# Patient Record
Sex: MALE | NOT HISPANIC OR LATINO | URBAN - METROPOLITAN AREA
[De-identification: names, ages, dates, MRNs, and addresses within clinical notes are randomized per-mention and may not be internally consistent; named-entity substitution may affect disease eponyms.]

---

## 2023-02-15 ENCOUNTER — OFFICE VISIT (OUTPATIENT)
Dept: FAMILY MEDICINE | Facility: CLINIC | Age: 28
End: 2023-02-15
Payer: COMMERCIAL

## 2023-02-15 VITALS
SYSTOLIC BLOOD PRESSURE: 116 MMHG | WEIGHT: 295 LBS | TEMPERATURE: 97.8 F | HEART RATE: 84 BPM | RESPIRATION RATE: 20 BRPM | OXYGEN SATURATION: 96 % | DIASTOLIC BLOOD PRESSURE: 81 MMHG

## 2023-02-15 DIAGNOSIS — R05.1 ACUTE COUGH: Primary | ICD-10-CM

## 2023-02-15 LAB
DEPRECATED S PYO AG THROAT QL EIA: NEGATIVE
FLUAV AG SPEC QL IA: NEGATIVE
FLUBV AG SPEC QL IA: NEGATIVE
GROUP A STREP BY PCR: NOT DETECTED

## 2023-02-15 PROCEDURE — 87804 INFLUENZA ASSAY W/OPTIC: CPT

## 2023-02-15 PROCEDURE — 87651 STREP A DNA AMP PROBE: CPT

## 2023-02-15 PROCEDURE — U0003 INFECTIOUS AGENT DETECTION BY NUCLEIC ACID (DNA OR RNA); SEVERE ACUTE RESPIRATORY SYNDROME CORONAVIRUS 2 (SARS-COV-2) (CORONAVIRUS DISEASE [COVID-19]), AMPLIFIED PROBE TECHNIQUE, MAKING USE OF HIGH THROUGHPUT TECHNOLOGIES AS DESCRIBED BY CMS-2020-01-R: HCPCS

## 2023-02-15 PROCEDURE — U0005 INFEC AGEN DETEC AMPLI PROBE: HCPCS

## 2023-02-15 PROCEDURE — 99203 OFFICE O/P NEW LOW 30 MIN: CPT | Mod: CS

## 2023-02-15 RX ORDER — ATOMOXETINE 60 MG/1
60 CAPSULE ORAL DAILY
COMMUNITY

## 2023-02-15 RX ORDER — CETIRIZINE HYDROCHLORIDE 10 MG/1
10 TABLET ORAL DAILY
COMMUNITY

## 2023-02-15 RX ORDER — GABAPENTIN 800 MG/1
800 TABLET ORAL 3 TIMES DAILY
COMMUNITY

## 2023-02-15 RX ORDER — DULOXETIN HYDROCHLORIDE 60 MG/1
60 CAPSULE, DELAYED RELEASE ORAL DAILY
COMMUNITY

## 2023-02-15 NOTE — PROGRESS NOTES
Assessment & Plan     Acute cough    - Symptomatic COVID-19 Virus (Coronavirus) by PCR Nose  - Streptococcus A Rapid Scr w Reflx to PCR  - Influenza A/B antigen  - Group A Streptococcus PCR Throat Swab      15 minutes spent on the date of the encounter doing review of test results, patient visit and documentation        See Patient Instructions    Return in about 1 week (around 2/22/2023), or if symptoms worsen or fail to improve.    [facility]  [facility]    Cande Pang is a 27 year old presenting for the following health issues:  Cough, Pharyngitis, and URI      HPI     Acute Illness  Acute illness concerns: cough, st, body aches  Onset/Duration: 2 days  Symptoms:  Fever: No  Chills/Sweats: YES  Headache (location?): No  Sinus Pressure: No  Conjunctivitis:  No  Ear Pain: no  Rhinorrhea: YES  Congestion: YES  Sore Throat: YES  Cough: YES-productive of clear sputum  Wheeze: No  Decreased Appetite: YES  Nausea: YES  Vomiting: No  Diarrhea: No  Fatigue/Achiness: YES  Sick/Strep Exposure: YES- co workers  Therapies tried and outcome: None      Review of Systems   Constitutional, HEENT, cardiovascular, pulmonary, gi and gu systems are negative, except as otherwise noted.      Objective    /81   Pulse 84   Temp 97.8  F (36.6  C) (Oral)   Resp 20   Wt 133.8 kg (295 lb)   SpO2 96%   There is no height or weight on file to calculate BMI.  Physical Exam   GENERAL: healthy, alert and no distress  EYES: Eyes grossly normal to inspection, PERRL and conjunctivae and sclerae normal  HENT: normal cephalic/atraumatic, ear canals and TM's normal, nose and mouth without ulcers or lesions, oropharynx clear, oral mucous membranes moist, tonsillar hypertrophy and tonsillar erythema  NECK: no adenopathy  RESP: lungs clear to auscultation - no rales, rhonchi or wheezes          Results for orders placed or performed in visit on 02/15/23    Streptococcus A Rapid Scr w Reflx to PCR     Status: Normal    Specimen: Throat; Swab   Result Value Ref Range    Group A Strep antigen Negative Negative   Influenza A/B antigen     Status: Normal    Specimen: Nose; Swab   Result Value Ref Range    Influenza A antigen Negative Negative    Influenza B antigen Negative Negative    Narrative    Test results must be correlated with clinical data. If necessary, results should be confirmed by a molecular assay or viral culture.

## 2023-02-15 NOTE — LETTER
February 15, 2023      Bird Man  [address] [id number]        To Whom It May Concern:    Bird Man  was seen on 2/15/23.  Please excuse him from work missed 2/15/23 and 2/16/23. He can return to work 2/17/23 as long as test results are negative and symptom have improved.       Sincerely,    Alysia Alvarez CNP    [facility]

## 2023-02-15 NOTE — PATIENT INSTRUCTIONS
Throat culture is pending. We will only call if it is positive  Salt water gargles  Tylenol or ibuprofen as directed on the package  Rest, push fluids  Recheck with your primary care provider if not improving in 7-10 days, sooner if needed     COVID test is pending. IF this is positive you will be called by a [facility] nurse for further direction.

## 2023-02-16 LAB — SARS-COV-2 RNA RESP QL NAA+PROBE: NEGATIVE

## 2023-04-13 ENCOUNTER — OFFICE VISIT (OUTPATIENT)
Dept: FAMILY MEDICINE | Facility: CLINIC | Age: 28
End: 2023-04-13
Payer: COMMERCIAL

## 2023-04-13 VITALS
DIASTOLIC BLOOD PRESSURE: 78 MMHG | HEART RATE: 95 BPM | TEMPERATURE: 97.9 F | SYSTOLIC BLOOD PRESSURE: 112 MMHG | OXYGEN SATURATION: 96 %

## 2023-04-13 DIAGNOSIS — J01.90 ACUTE NON-RECURRENT SINUSITIS, UNSPECIFIED LOCATION: ICD-10-CM

## 2023-04-13 DIAGNOSIS — J02.9 ACUTE SORE THROAT: Primary | ICD-10-CM

## 2023-04-13 PROBLEM — F13.20 BENZODIAZEPINE DEPENDENCE (H): Status: ACTIVE | Noted: 2021-11-14

## 2023-04-13 PROBLEM — F11.90 OPIOID USE DISORDER: Status: ACTIVE | Noted: 2021-11-14

## 2023-04-13 LAB
DEPRECATED S PYO AG THROAT QL EIA: NEGATIVE
GROUP A STREP BY PCR: NOT DETECTED

## 2023-04-13 PROCEDURE — 87651 STREP A DNA AMP PROBE: CPT

## 2023-04-13 PROCEDURE — 99213 OFFICE O/P EST LOW 20 MIN: CPT

## 2023-04-13 RX ORDER — IBUPROFEN 600 MG/1
TABLET, FILM COATED ORAL
COMMUNITY
Start: 2022-06-06

## 2023-04-13 RX ORDER — FLUTICASONE PROPIONATE 50 MCG
1 SPRAY, SUSPENSION (ML) NASAL DAILY
Qty: 16 G | Refills: 1 | Status: SHIPPED | OUTPATIENT
Start: 2023-04-13

## 2023-04-13 RX ORDER — HYDROXYZINE HYDROCHLORIDE 25 MG/1
TABLET, FILM COATED ORAL
COMMUNITY
Start: 2023-04-04

## 2023-04-13 NOTE — PROGRESS NOTES
Assessment & Plan     Acute sore throat    - Streptococcus A Rapid Scr w Reflx to PCR    Acute non-recurrent sinusitis, unspecified location    - amoxicillin-clavulanate (AUGMENTIN) 875-125 MG tablet; Take 1 tablet by mouth 2 times daily for 7 days  - fluticasone (FLONASE) 50 MCG/ACT nasal spray; Spray 1 spray into both nostrils daily      15 minutes spent by me on the date of the encounter doing review of test results, patient visit and documentation       See Patient Instructions    Return in about 1 week (around 4/20/2023), or if symptoms worsen or fail to improve.    [facility]  [facility]    Cande Pang is a 27 year old, presenting for the following health issues:  Urgent Care and Pharyngitis (One month with sore throat. Pt did not come in for check up.)         View : No data to display.              HPI     Sore throat for past month with PND, nasal congestion and sinus pressure. No fevers. Hx of psoriasis, but has not been on Humira for over 10 years.   Hx of seasonal allergies      Review of Systems   Constitutional, HEENT, cardiovascular, pulmonary, gi and gu systems are negative, except as otherwise noted.      Objective    /78   Pulse 95   Temp 97.9  F (36.6  C) (Tympanic)   SpO2 96%   There is no height or weight on file to calculate BMI.  Physical Exam   GENERAL: healthy, alert and no distress  EYES: Eyes grossly normal to inspection, PERRL and conjunctivae and sclerae normal  HENT: normal cephalic/atraumatic, right ear: erythematous, left ear: normal: no effusions, no erythema, normal landmarks, nasal mucosa edematous , rhinorrhea thick and grey, oropharynx clear, oral mucous membranes moist and sinuses: maxillary, frontal tenderness on bilaterally  NECK: no adenopathy  RESP: lungs clear to auscultation - no rales, rhonchi or wheezes

## 2023-05-26 ENCOUNTER — OFFICE VISIT (OUTPATIENT)
Dept: FAMILY MEDICINE | Facility: CLINIC | Age: 28
End: 2023-05-26
Payer: COMMERCIAL

## 2023-05-26 VITALS
TEMPERATURE: 97.1 F | DIASTOLIC BLOOD PRESSURE: 87 MMHG | OXYGEN SATURATION: 99 % | SYSTOLIC BLOOD PRESSURE: 130 MMHG | HEART RATE: 76 BPM

## 2023-05-26 DIAGNOSIS — K08.89 PAIN OF TOOTH SOCKET: Primary | ICD-10-CM

## 2023-05-26 PROCEDURE — 99213 OFFICE O/P EST LOW 20 MIN: CPT

## 2023-05-26 NOTE — PROGRESS NOTES
Assessment & Plan     Pain of tooth socket         Patient Instructions   To go to emergency dentist.           [facility]  [place] [facility]    Cande Pang is a 27 year old male who presents to clinic today for the following health issues:  Chief Complaint   Patient presents with     Urgent Care     Dental Pain     Pt had 3 bottom tooth taken out and has been having pain since. Unable to get in with his dental provider and told him to come to      Patient reports that he had 3 teeth pulled 4 days ago. He has pain in the tooth that was pulled on the lower left back side of mouth.         Review of Systems        Objective    /87   Pulse 76   Temp 97.1  F (36.2  C) (Tympanic)   SpO2 99%   Physical Exam  Vitals and nursing note reviewed.   Constitutional:       Appearance: Normal appearance. He is not ill-appearing.   HENT:      Head: Normocephalic and atraumatic.      Mouth/Throat:      Comments: Gum swelling along left lower jaw posterior. No pus/drainage.   Skin:     General: Skin is warm and dry.   Neurological:      General: No focal deficit present.      Mental Status: He is alert and oriented to person, place, and time.   Psychiatric:         Mood and Affect: Mood normal.         Behavior: Behavior normal.         Thought Content: Thought content normal.         Judgment: Judgment normal.

## 2023-09-10 ENCOUNTER — OFFICE VISIT (OUTPATIENT)
Dept: FAMILY MEDICINE | Facility: CLINIC | Age: 28
End: 2023-09-10
Payer: COMMERCIAL

## 2023-09-10 VITALS
OXYGEN SATURATION: 96 % | TEMPERATURE: 97.5 F | RESPIRATION RATE: 20 BRPM | HEART RATE: 98 BPM | DIASTOLIC BLOOD PRESSURE: 88 MMHG | SYSTOLIC BLOOD PRESSURE: 132 MMHG

## 2023-09-10 DIAGNOSIS — J02.9 SORE THROAT: ICD-10-CM

## 2023-09-10 DIAGNOSIS — J06.9 UPPER RESPIRATORY TRACT INFECTION, UNSPECIFIED TYPE: ICD-10-CM

## 2023-09-10 DIAGNOSIS — J01.40 ACUTE PANSINUSITIS, RECURRENCE NOT SPECIFIED: Primary | ICD-10-CM

## 2023-09-10 LAB — DEPRECATED S PYO AG THROAT QL EIA: NEGATIVE

## 2023-09-10 PROCEDURE — 87635 SARS-COV-2 COVID-19 AMP PRB: CPT

## 2023-09-10 PROCEDURE — 99213 OFFICE O/P EST LOW 20 MIN: CPT

## 2023-09-10 PROCEDURE — 87651 STREP A DNA AMP PROBE: CPT

## 2023-09-10 RX ORDER — ECHINACEA PURPUREA EXTRACT 125 MG
2 TABLET ORAL 2 TIMES DAILY PRN
Qty: 37.5 ML | Refills: 0 | Status: SHIPPED | OUTPATIENT
Start: 2023-09-10

## 2023-09-10 RX ORDER — ACETAMINOPHEN 500 MG
500-1000 TABLET ORAL EVERY 6 HOURS PRN
Qty: 56 TABLET | Refills: 0 | Status: SHIPPED | OUTPATIENT
Start: 2023-09-10 | End: 2023-09-17

## 2023-09-10 ASSESSMENT — ENCOUNTER SYMPTOMS
SHORTNESS OF BREATH: 1
WHEEZING: 1
FEVER: 0
CHILLS: 0
SORE THROAT: 1
RHINORRHEA: 1
CHEST TIGHTNESS: 1
COUGH: 1

## 2023-09-10 NOTE — PROGRESS NOTES
Assessment & Plan        1. Acute pansinusitis, recurrence not specified    -viral illness  -Patient advised to continue using Flonase following sinus rinses  - sodium chloride (OCEAN) 0.65 % nasal spray; Spray 2 sprays in nostril 2 times daily as needed for congestion  Dispense: 37.5 mL; Refill: 0  - acetaminophen (TYLENOL) 500 MG tablet; Take 1-2 tablets (500-1,000 mg) by mouth every 6 hours as needed for mild pain  Dispense: 56 tablet; Refill: 0    2. Upper respiratory tract infection, unspecified type    Patient advised symptoms are consistent with a viral illness and symptoms take 7 to 10 days to resolve from symptom onset.  Acetaminophen, ibuprofen recommended for aches and pains     3. Sore throat    -Strep (-)  -COVID-19 PCR pending  - Streptococcus A Rapid Scr w Reflx to PCR  - Symptomatic COVID-19 Virus (Coronavirus) by PCR Nose  - Group A Streptococcus PCR Throat Swab  - acetaminophen (TYLENOL) 500 MG tablet; Take 1-2 tablets (500-1,000 mg) by mouth every 6 hours as needed for mild pain  Dispense: 56 tablet; Refill: 0    Results for orders placed or performed in visit on 09/10/23   Streptococcus A Rapid Scr w Reflx to PCR     Status: Normal    Specimen: Throat; Swab   Result Value Ref Range    Group A Strep antigen Negative Negative       Patient Instructions   The vast majority of sinus infections are caused by viruses and improve after 7-10 days. This means antibiotics will not help your symptoms and possible give you undesirable side effects like diarrhea and upset stomach.    First, improve your sinus hygiene by:  Flonase/fluticasone nasal spray 2 sprays in each nostril once a day for 1-4 weeks.  This may take several days to become effective.   Consider saline nasal rinses  Ibuprofen 400 mg to 600 mg 4-6 times a day as needed for pain relief and anti-inflammatory      If your symptoms are not improving or worsening after 10 days since symptom onset, follow up and be evaluated in the clinic.        Return if symptoms worsen or fail to improve, for Follow up.    At the end of the encounter, I discussed results, diagnosis, medications. Discussed red flags for immediate return to clinic/ER, as well as indications for follow up if no improvement. Patient understood and agreed to plan. Patient was stable for discharge.    Cande Pang is a 28 year old male who presents to clinic today  for the following health issues:  Chief Complaint   Patient presents with    Pharyngitis     Sore throat, nasal congestion x 2-3 days      HPI     Patient reports sore throat and congestion, runny nose x 3 days. He also reports cough, wheezing, shortness of breath, chest tightness. He has a history of allergies. He takes zyrtec and Flonase for allergies. No history of Asthma. COVID-19 test was negative 2 days ago. No fever, chills.    Review of Systems   Constitutional:  Negative for chills and fever.   HENT:  Positive for congestion, rhinorrhea and sore throat.    Respiratory:  Positive for cough, chest tightness, shortness of breath and wheezing.        Problem List:  2021-11: Benzodiazepine dependence (H)  2021-11: Opioid use disorder  2012-04: Psoriatic arthropathy (H)      No past medical history on file.    Social History     Tobacco Use    Smoking status: Every Day     Types: Vaping Device     Passive exposure: Never    Smokeless tobacco: Never   Substance Use Topics    Alcohol use: No           Objective    /88   Pulse 98   Temp 97.5  F (36.4  C) (Tympanic)   Resp 20   SpO2 96%   Physical Exam  Constitutional:       Appearance: Normal appearance.   HENT:      Head: Normocephalic.      Right Ear: Tympanic membrane normal.      Left Ear: Tympanic membrane normal.      Nose: Congestion present.      Right Sinus: Maxillary sinus tenderness and frontal sinus tenderness present.      Left Sinus: Maxillary sinus tenderness and frontal sinus tenderness present.      Mouth/Throat:      Mouth: Mucous membranes  are moist.      Pharynx: Oropharynx is clear. Uvula midline. No posterior oropharyngeal erythema.   Cardiovascular:      Rate and Rhythm: Normal rate and regular rhythm.   Pulmonary:      Effort: Pulmonary effort is normal.      Breath sounds: Normal breath sounds.   Lymphadenopathy:      Head:      Right side of head: No submental, submandibular or tonsillar adenopathy.      Left side of head: No submental, submandibular or tonsillar adenopathy.      Cervical: No cervical adenopathy.      Right cervical: No superficial cervical adenopathy.     Left cervical: No superficial cervical adenopathy.   Skin:     General: Skin is warm and dry.      Findings: No rash.   Neurological:      Mental Status: He is alert.   Psychiatric:         Mood and Affect: Mood normal.         Behavior: Behavior normal.              Sania Colorado PA-C

## 2023-09-10 NOTE — PATIENT INSTRUCTIONS
The vast majority of sinus infections are caused by viruses and improve after 7-10 days. This means antibiotics will not help your symptoms and possible give you undesirable side effects like diarrhea and upset stomach.    First, improve your sinus hygiene by:  Flonase/fluticasone nasal spray 2 sprays in each nostril once a day for 1-4 weeks.  This may take several days to become effective.   Consider saline nasal rinses  Ibuprofen 400 mg to 600 mg 4-6 times a day as needed for pain relief and anti-inflammatory      If your symptoms are not improving or worsening after 10 days since symptom onset, follow up and be evaluated in the clinic.

## 2023-09-11 LAB
GROUP A STREP BY PCR: NOT DETECTED
SARS-COV-2 RNA RESP QL NAA+PROBE: NEGATIVE